# Patient Record
Sex: MALE | Race: WHITE | NOT HISPANIC OR LATINO | URBAN - METROPOLITAN AREA
[De-identification: names, ages, dates, MRNs, and addresses within clinical notes are randomized per-mention and may not be internally consistent; named-entity substitution may affect disease eponyms.]

---

## 2020-11-10 ENCOUNTER — OFFICE VISIT (OUTPATIENT)
Dept: FAMILY MEDICINE | Facility: CLINIC | Age: 55
End: 2020-11-10
Payer: MEDICARE

## 2020-11-10 VITALS
RESPIRATION RATE: 16 BRPM | WEIGHT: 135.4 LBS | DIASTOLIC BLOOD PRESSURE: 80 MMHG | TEMPERATURE: 98.6 F | SYSTOLIC BLOOD PRESSURE: 126 MMHG | HEART RATE: 74 BPM | OXYGEN SATURATION: 96 %

## 2020-11-10 DIAGNOSIS — C73 PAPILLARY THYROID CARCINOMA (H): Primary | ICD-10-CM

## 2020-11-10 LAB — TSH SERPL DL<=0.05 MIU/L-ACNC: 1 UIU/ML (ref 0.3–5)

## 2020-11-10 PROCEDURE — 36415 COLL VENOUS BLD VENIPUNCTURE: CPT | Performed by: STUDENT IN AN ORGANIZED HEALTH CARE EDUCATION/TRAINING PROGRAM

## 2020-11-10 PROCEDURE — 99203 OFFICE O/P NEW LOW 30 MIN: CPT | Mod: GC | Performed by: STUDENT IN AN ORGANIZED HEALTH CARE EDUCATION/TRAINING PROGRAM

## 2020-11-10 NOTE — PROGRESS NOTES
SUBJECTIVE       Bernard J Ganser is a 55 year old  male with a PMH significant for:   There is no problem list on file for this patient.      He presents for ED follow up.    Patient seen at [facility] emergency department earlier today with concern for neck swelling.  Patient states he thought he had a cyst or something in his neck, but has been unable to drain it.  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  Apparently, according to ED physician, there was a scan performed in 2014 that showed possible evidence of carcinoma as well.  Remainder of work-up in the ED revealed only mildly elevated WBC of 12.3.    Patient was directed to come to primary care clinic to further coordinate his care.  He states that other than the swelling his neck, he is feeling quite well.  Denies any other symptoms, including a number of B symptoms such as fever/chills, night sweats, weight loss.    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  Reports he has been sober for a number of years now.  And also is quit tobacco use.  Does occasionally smoke marijuana.    PMH, Medications and Allergies were reviewed and updated as needed.        REVIEW OF SYSTEMS     CONSTITUTIONAL: NEGATIVE for fever, chills, change in weight  ENT/MOUTH: Left neck swelling  RESP: NEGATIVE for significant cough or SOB  CV: NEGATIVE for chest pain, palpitations or peripheral edema  GI: NEGATIVE for nausea, abdominal pain, heartburn, or change in bowel habits  MUSCULOSKELETAL: NEGATIVE for significant arthralgias or myalgia  NEURO: NEGATIVE for weakness, dizziness or paresthesias  PSYCHIATRIC: NEGATIVE for changes in mood or affect        OBJECTIVE     Vitals:    11/10/20 1403   BP: 126/80   BP Location: Left arm   Patient Position: Sitting   Cuff Size: Adult  Regular   Pulse: 74   Resp: 16   Temp: 98.6  F (37  C)   TempSrc: Oral   SpO2: 96%   Weight: 61.4 kg (135 lb 6.4 oz)     There is no height or weight on file to calculate BMI.    Constitutional: Awake, alert, cooperative, no apparent distress, and appears stated age.  Hematologic / Lymphatic: Multiple discrete masses palpated throughout the neck, including a number of small, soft masses that could be lymph nodes.  There are also firm masses that are nontender.  All masses are somewhat mobile, none are firmly fixed.  Lungs: No increased work of breathing, good air exchange, clear to auscultation bilaterally, no crackles or wheezing.  Cardiovascular: Regular rate and rhythm, normal S1 and S2  Abdomen: soft, non-distended, non-tender, no masses palpated, no hepatosplenomegally  Neurologic: Awake, alert, oriented to name, place and time.  Cranial nerves II-XII are grossly intact.  Neuropsychiatric: Normal affect, mood, orientation, memory and insight.  Skin: No rashes, erythema, pallor, petechia or purpura.      ASSESSMENT AND PLAN     1. Papillary thyroid carcinoma (H)  Patient diagnosed with likely metastatic papillary thyroid cancer based on CT imaging at the hospital earlier today.  With possible evidence of this on scan from 6 years ago according to narrative report by ED physician, there is felt that this is indolent cancer.  Still, I have concern that it may be somewhat advanced in staging.  Will obtain thyroid cascade today, to be used by specialist going forward.  I placed a referral to [place] [facility].  I provided the patient with information to call and schedule appointment with them, the [place] and they have a downtown office in [place] nearby.  We will be available to help care for patient's other medical needs moving forward.  - Oncology/Hematology Adult Referral; Future  - Thyroid [place] ([facility])      I ended our visit today by discussing the patient's diagnoses and recommended  treatment. Please refer to today's diagnoses and orders for further details. I briefly discussed the pathophysiology of these conditions and outlined their expected course. I discussed the warning symptoms and signs that indicate an atypical course that would need urgent or emergent care. I also discussed self care strategies for symptom relief. Patient voiced understanding of plan of care and was in full agreement to proceed as discussed.    RTC as needed    Patient staffed with Dr. Bertin Norris MD

## 2020-11-10 NOTE — PROGRESS NOTES
Preceptor attestation:  Vital signs reviewed: /80 (BP Location: Left arm, Patient Position: Sitting, Cuff Size: Adult Regular)   Pulse 74   Temp 98.6  F (37  C) (Oral)   Resp 16   Wt 61.4 kg (135 lb 6.4 oz)   SpO2 96%     Patient seen, evaluated, and discussed with the resident.  I have verified the content of the note, which accurately reflects my assessment of the patient and the plan of care.    Supervising physician: Emily Denton MD  [facility]

## 2020-11-10 NOTE — PATIENT INSTRUCTIONS
[address], [address]  [place], [place] [address]    Call: [phone]    You have been diagnosed with papillary thyroid carcinoma based on a CT scan performed at [facility] on 11/10/20    11/12/20   ONCOLOGY REFERRAL    [facility]  Phone: [phone]  Fax: [phone]    Referral and demographics faxed to [phone] who will contact patient to schedule.     Mayra Cha

## 2020-11-11 ENCOUNTER — COMMUNICATION - HEALTHEAST (OUTPATIENT)
Dept: SCHEDULING | Facility: CLINIC | Age: 55
End: 2020-11-11

## 2021-05-30 ENCOUNTER — RECORDS - HEALTHEAST (OUTPATIENT)
Dept: ADMINISTRATIVE | Facility: CLINIC | Age: 56
End: 2021-05-30